# Patient Record
Sex: MALE | Race: WHITE
[De-identification: names, ages, dates, MRNs, and addresses within clinical notes are randomized per-mention and may not be internally consistent; named-entity substitution may affect disease eponyms.]

---

## 2018-05-01 ENCOUNTER — HOSPITAL ENCOUNTER (EMERGENCY)
Dept: HOSPITAL 62 - ER | Age: 23
Discharge: HOME | End: 2018-05-01
Payer: COMMERCIAL

## 2018-05-01 VITALS — SYSTOLIC BLOOD PRESSURE: 160 MMHG | DIASTOLIC BLOOD PRESSURE: 62 MMHG
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
## 2018-05-01 DIAGNOSIS — S93.401A: Primary | ICD-10-CM

## 2018-05-01 PROCEDURE — 99283 EMERGENCY DEPT VISIT LOW MDM: CPT

## 2018-05-01 NOTE — ER DOCUMENT REPORT
HPI





- HPI


Pain Level: 4


Context: 





Patient is a 22-year-old male who presents emergency department with a right 

ankle sprain asking for second opinion and possible MRI.  Patient states that 

he is at PT this morning and rolled his ankle in a ditch.  He states that he 

went to [facility] had an x-ray done told that he has an ankle sprain and to follow-

up with her sports medicine team on Thursday.  He was told at that visit he 

possibly has small tears in ligaments and his ankle and that he wanted an MRI 

to evaluate this injury further   They came to [place] for second opinion.  

Patient states that he has been elevating it and icing it and taking naproxen.





Past Medical History





- Social History


Smoking Status: Never Smoker


Family History: Reviewed & Not Pertinent





Vertical Provider Document





- CONSTITUTIONAL


Agree With Documented VS: Yes


Notes: 





PHYSICAL EXAM


GENERAL: Alert, interacts well. 


HEAD: Normocephalic, atraumatic.


EXTREMITIES: Moves all 4 extremities spontaneously.  Lateral soft tissue 

swelling with full range of motion, dorsalis pedis pulses 2/4 bilaterally. No 

cyanosis.  Capillary refill less than 2 seconds in bilateral lower extremity 

digits.  Sharp to dull sensation intact.


NEUROLOGICAL: Alert and oriented x4. Normal speech.


PSYCH: Normal affect, normal mood.


SKIN: Warm, dry, normal turgor. No rashes or lesions noted.








- INFECTION CONTROL


TRAVEL OUTSIDE OF THE [place] IN LAST 30 DAYS: No





Course





- Re-evaluation


Re-evalutation: 





05/01/18 23:29


No evidence of a septic joint, gout flare, dislocation, or fracture on exam and 

imaging. Vitals wnl. At this time, I do not see an indication for labs or 

further imaging. Will discharge with conservative measures, return precautions, 

and follow-up recommendations.





- Vital Signs


Vital signs: 


 











Temp Pulse Resp BP Pulse Ox


 


 98.5 F   75   18   160/62 H  97 


 


 05/01/18 21:38  05/01/18 21:38  05/01/18 21:38  05/01/18 21:38  05/01/18 21:38














Discharge





- Discharge


Clinical Impression: 


Ankle injury


Qualifiers:


 Encounter type: initial encounter Laterality: right Qualified Code(s): 

S99.911A - Unspecified injury of right ankle, initial encounter





Condition: Good


Disposition: HOME, SELF-CARE


Instructions:  Ice & Elevation ([facility]), Use of Crutches ([facility]), Sprained Ankle ([facility]

)


Additional Instructions: 


Please follow-up with orthopedist listed on your discharge paperwork.


Referrals: 


SUMANTH THOMPSON MD [ACTIVE STAFF] - 05/14/18 (if symptoms do not improve)

## 2018-05-01 NOTE — RADIOLOGY REPORT (SQ)
EXAM DESCRIPTION:  FOOT RIGHT COMPLETE



COMPLETED DATE/TIME:  5/1/2018 10:11 pm



REASON FOR STUDY:  injury, pain



COMPARISON:  None.



NUMBER OF VIEWS:  Three views.



TECHNIQUE:  AP, lateral and oblique  radiographic images acquired of the right foot.



LIMITATIONS:  None.



FINDINGS:  MINERALIZATION: Normal.

BONES: No acute fracture or dislocation.  No worrisome bone lesions.

JOINTS: No effusions.

SOFT TISSUES: No soft tissue swelling.  No foreign body.

OTHER: No other significant finding.



IMPRESSION:  NEGATIVE STUDY OF THE RIGHT FOOT. NO RADIOGRAPHIC EVIDENCE OF ACUTE INJURY.



TECHNICAL DOCUMENTATION:  JOB ID:  [id number]

 2011 [employer]- All Rights Reserved



Reading location - IP/workstation name: [email or web address]

## 2018-05-01 NOTE — RADIOLOGY REPORT (SQ)
EXAM DESCRIPTION:  ANKLE RIGHT COMPLETE



COMPLETED DATE/TIME:  5/1/2018 10:11 pm



REASON FOR STUDY:  injury, pain



COMPARISON:  None.



NUMBER OF VIEWS:  Three views.



TECHNIQUE:  AP, lateral, and oblique radiographic images acquired of the right ankle.



LIMITATIONS:  None.



FINDINGS:  MINERALIZATION: Normal.

BONES: No acute fracture or dislocation.  No worrisome bone lesions.

JOINTS: No effusions.

SOFT TISSUES: Moderate lateral soft tissue swelling.

OTHER: No other significant finding.



IMPRESSION:  Soft tissue swelling with no fracture.



TECHNICAL DOCUMENTATION:  JOB ID:  [id number]

 2011 [employer]- All Rights Reserved



Reading location - IP/workstation name: [email or web address]